# Patient Record
Sex: FEMALE | Race: WHITE | Employment: FULL TIME | URBAN - METROPOLITAN AREA
[De-identification: names, ages, dates, MRNs, and addresses within clinical notes are randomized per-mention and may not be internally consistent; named-entity substitution may affect disease eponyms.]

---

## 2021-03-18 ENCOUNTER — OFFICE VISIT (OUTPATIENT)
Dept: SURGERY | Age: 26
End: 2021-03-18
Payer: COMMERCIAL

## 2021-03-18 VITALS
TEMPERATURE: 98.7 F | DIASTOLIC BLOOD PRESSURE: 69 MMHG | BODY MASS INDEX: 20.83 KG/M2 | HEIGHT: 65 IN | WEIGHT: 125 LBS | SYSTOLIC BLOOD PRESSURE: 105 MMHG | HEART RATE: 90 BPM

## 2021-03-18 DIAGNOSIS — N63.10 BREAST MASS, RIGHT: Primary | ICD-10-CM

## 2021-03-18 DIAGNOSIS — N64.4 MASTODYNIA OF RIGHT BREAST: ICD-10-CM

## 2021-03-18 PROCEDURE — 76642 ULTRASOUND BREAST LIMITED: CPT | Performed by: SURGERY

## 2021-03-18 PROCEDURE — 99242 OFF/OP CONSLTJ NEW/EST SF 20: CPT | Performed by: SURGERY

## 2021-03-18 RX ORDER — ESCITALOPRAM OXALATE 20 MG/1
TABLET ORAL
COMMUNITY
Start: 2021-02-19

## 2021-03-18 RX ORDER — NORGESTIMATE AND ETHINYL ESTRADIOL 7DAYSX3 28
KIT ORAL
COMMUNITY
Start: 2021-03-13

## 2021-03-18 NOTE — LETTER
3/18/2021 Patient: Debbie Santos YOB: 1995 Date of Visit: 3/18/2021 Jabier Mccormick MD 
[facility] [phone] Via Fax: [phone] Dear Jabier Mccormick MD, Thank you for referring Ms. Debbie Santos to [facility] for evaluation. My notes for this consultation are attached. If you have questions, please do not hesitate to call me. I look forward to following your patient along with you.  
 
 
Sincerely, 
 
Jorge Almaraz MD

## 2021-03-18 NOTE — PROGRESS NOTES
HISTORY OF PRESENT ILLNESS  Tammi Hopkins is a 23 y.o. female. HPI NEW Patient presents for consultation at the request of Dr. Luigi Wong for RIGHT breast lump, redness, and pea-sized lump since Tuesday. At first she thought that it was period pain, but then she noticed the redness and lump. Redness has decreased but lump is still present. No family history of breast or ovarian cancer. The patient has not had breast imaging done before. History reviewed. No pertinent past medical history. History reviewed. No pertinent surgical history. OB History    No obstetric history on file. Obstetric Comments   Menarche:  17. LMP: 3/1/21. # of Children:  0. Age at Delivery of First Child:  n/a. Hysterectomy/oophorectomy:  NO/NO. Breast Bx:  no.  Hx of Breast Feeding:  no. BCP:  yes. Hormone therapy:  no.                History reviewed. No pertinent family history. Social History     Tobacco Use    Smoking status: Never Smoker    Smokeless tobacco: Never Used   Substance Use Topics    Alcohol use: Not Currently      Prior to Admission medications    Medication Sig Start Date End Date Taking? Authorizing Provider   escitalopram oxalate (LEXAPRO) 20 mg tablet TAKE 1 TABLET BY MOUTH EVERY DAY 2/19/21   Provider, Historical   Tri-Estarylla 0.18/0.215/0.25 mg-35 mcg (28) tab  3/13/21   Provider, Historical      No Known Allergies    Review of Systems   Constitutional: Negative. HENT: Negative. Eyes: Negative. Respiratory: Negative. Cardiovascular: Negative. Gastrointestinal: Negative. Genitourinary: Negative. Musculoskeletal: Negative. Skin: Negative. Neurological: Negative. Endo/Heme/Allergies: Negative. Psychiatric/Behavioral: Negative. Physical Exam  Chest:      Breasts: Breasts are symmetrical.         Right: Mass (1.5 cm flat round mobile mass 10:00 1/3) and skin change (mild discoloration. no skin retraction or thickening) present.  No inverted nipple, nipple discharge or tenderness. Left: No inverted nipple, mass, nipple discharge, skin change or tenderness. Lymphadenopathy:      Upper Body:      Right upper body: No supraclavicular or axillary adenopathy. Left upper body: No supraclavicular or axillary adenopathy. BREAST ULTRASOUND  Indication: RIGHT breast mass 10:00 periareolar  Technique: The RIGHT breast and axilla were scanned using a high-frequency linear-array near-field transducer  Findings: 1cm flat oval hypoechoic mass: residual breast cyst. No abnormal mass, lesion, or shadowing noted. Impression: reidual breast tissue   Disposition: should resolve completely  ASSESSMENT and PLAN    ICD-10-CM ICD-9-CM    1. Breast mass, right  N63.10 611.72    2. Mastodynia of right breast  N64.4 611.71      Total time spent with patient: 25 minutes   Mother was on speaker phone    Pt had a RIGHT breast mass and associated skin erythema. Erythema is resolving. Ultrasound shows a small residual breast cysts. Acute onset of a superficial cyst can cause skin erythema. Should resolve completely. Follow up if mass persists    Discussed self breast exam and explained what a noncancerous mass feels like (smooth, round and mobile) compared with a cancerous mass (hard, fixed, bumpy and often associated with a skin dimple).

## 2022-09-19 NOTE — PATIENT INSTRUCTIONS

## 2023-05-22 RX ORDER — ESCITALOPRAM OXALATE 20 MG/1
1 TABLET ORAL DAILY
COMMUNITY
Start: 2021-02-19

## 2023-05-22 RX ORDER — NORGESTIMATE AND ETHINYL ESTRADIOL 7DAYSX3 28
KIT ORAL
COMMUNITY
Start: 2021-03-13